# Patient Record
Sex: FEMALE | Race: WHITE
[De-identification: names, ages, dates, MRNs, and addresses within clinical notes are randomized per-mention and may not be internally consistent; named-entity substitution may affect disease eponyms.]

---

## 2018-05-05 ENCOUNTER — HOSPITAL ENCOUNTER (EMERGENCY)
Dept: HOSPITAL 9 - MADERS | Age: 38
Discharge: HOME | End: 2018-05-05
Payer: SELF-PAY

## 2018-05-05 DIAGNOSIS — F41.9: ICD-10-CM

## 2018-05-05 DIAGNOSIS — J01.90: Primary | ICD-10-CM

## 2018-05-05 DIAGNOSIS — F17.210: ICD-10-CM

## 2018-05-05 PROCEDURE — 99283 EMERGENCY DEPT VISIT LOW MDM: CPT

## 2018-07-17 ENCOUNTER — HOSPITAL ENCOUNTER (EMERGENCY)
Dept: HOSPITAL 18 - NAV ERS | Age: 38
Discharge: HOME | End: 2018-07-17
Payer: MEDICAID

## 2018-07-17 DIAGNOSIS — F41.9: ICD-10-CM

## 2018-07-17 DIAGNOSIS — F17.210: ICD-10-CM

## 2018-07-17 DIAGNOSIS — A59.01: Primary | ICD-10-CM

## 2018-07-17 DIAGNOSIS — Z79.899: ICD-10-CM

## 2018-07-17 LAB
BACTERIA UR QL AUTO: (no result) HPF
SP GR UR STRIP: 1 (ref 1–1.04)
URNS CMNT MICRO: NO
WBC UR QL AUTO: (no result) HPF (ref 0–3)

## 2018-07-17 PROCEDURE — 99406 BEHAV CHNG SMOKING 3-10 MIN: CPT

## 2018-07-17 PROCEDURE — 87510 GARDNER VAG DNA DIR PROBE: CPT

## 2018-07-17 PROCEDURE — 87591 N.GONORRHOEAE DNA AMP PROB: CPT

## 2018-07-17 PROCEDURE — 87660 TRICHOMONAS VAGIN DIR PROBE: CPT

## 2018-07-17 PROCEDURE — 87480 CANDIDA DNA DIR PROBE: CPT

## 2018-07-17 PROCEDURE — 81015 MICROSCOPIC EXAM OF URINE: CPT

## 2018-07-17 PROCEDURE — 87086 URINE CULTURE/COLONY COUNT: CPT

## 2018-07-17 PROCEDURE — 87491 CHLMYD TRACH DNA AMP PROBE: CPT

## 2018-07-17 PROCEDURE — 87210 SMEAR WET MOUNT SALINE/INK: CPT

## 2018-07-17 PROCEDURE — 81003 URINALYSIS AUTO W/O SCOPE: CPT

## 2018-09-13 ENCOUNTER — HOSPITAL ENCOUNTER (EMERGENCY)
Dept: HOSPITAL 9 - MADERS | Age: 38
Discharge: HOME | End: 2018-09-13
Payer: MEDICAID

## 2018-09-13 DIAGNOSIS — F17.210: ICD-10-CM

## 2018-09-13 DIAGNOSIS — J01.90: Primary | ICD-10-CM

## 2018-09-13 PROCEDURE — 99283 EMERGENCY DEPT VISIT LOW MDM: CPT

## 2019-05-14 ENCOUNTER — HOSPITAL ENCOUNTER (EMERGENCY)
Dept: HOSPITAL 9 - MADERS | Age: 39
Discharge: HOME | End: 2019-05-14
Payer: MEDICAID

## 2019-05-14 DIAGNOSIS — F17.210: ICD-10-CM

## 2019-05-14 DIAGNOSIS — F41.9: ICD-10-CM

## 2019-05-14 DIAGNOSIS — N83.202: Primary | ICD-10-CM

## 2019-05-14 LAB
ALBUMIN SERPL BCG-MCNC: 4.3 G/DL (ref 3.5–5)
ALP SERPL-CCNC: 110 U/L (ref 40–150)
ALT SERPL W P-5'-P-CCNC: 46 U/L (ref 8–55)
ANION GAP SERPL CALC-SCNC: 12 MMOL/L (ref 10–20)
AST SERPL-CCNC: 21 U/L (ref 5–34)
BACTERIA UR QL AUTO: (no result) HPF
BASOPHILS # BLD AUTO: 0.1 THOU/UL (ref 0–0.2)
BASOPHILS NFR BLD AUTO: 0.9 % (ref 0–1)
BILIRUB SERPL-MCNC: 0.2 MG/DL (ref 0.2–1.2)
BUN SERPL-MCNC: 8 MG/DL (ref 7–18.7)
CALCIUM SERPL-MCNC: 9.5 MG/DL (ref 7.8–10.44)
CHLORIDE SERPL-SCNC: 104 MMOL/L (ref 98–107)
CO2 SERPL-SCNC: 24 MMOL/L (ref 22–29)
CREAT CL PREDICTED SERPL C-G-VRATE: 0 ML/MIN (ref 70–130)
EOSINOPHIL # BLD AUTO: 0.3 THOU/UL (ref 0–0.7)
EOSINOPHIL NFR BLD AUTO: 2.2 % (ref 0–10)
GLOBULIN SER CALC-MCNC: 3.3 G/DL (ref 2.4–3.5)
GLUCOSE SERPL-MCNC: 91 MG/DL (ref 70–105)
HGB BLD-MCNC: 13.5 G/DL (ref 12–16)
LYMPHOCYTES # BLD AUTO: 2.9 THOU/UL (ref 1.2–3.4)
LYMPHOCYTES NFR BLD AUTO: 23.1 % (ref 21–51)
MCH RBC QN AUTO: 27.5 PG (ref 27–31)
MCV RBC AUTO: 86.2 FL (ref 78–98)
MONOCYTES # BLD AUTO: 0.7 THOU/UL (ref 0.11–0.59)
MONOCYTES NFR BLD AUTO: 5.3 % (ref 0–10)
NEUTROPHILS # BLD AUTO: 8.5 THOU/UL (ref 1.4–6.5)
NEUTROPHILS NFR BLD AUTO: 68.5 % (ref 42–75)
PLATELET # BLD AUTO: 293 THOU/UL (ref 130–400)
POTASSIUM SERPL-SCNC: 3.8 MMOL/L (ref 3.5–5.1)
PREGU CONTROL BACKGROUND?: (no result)
PREGU CONTROL BAR APPEAR?: YES
RBC # BLD AUTO: 4.92 MILL/UL (ref 4.2–5.4)
RBC UR QL AUTO: (no result) HPF (ref 0–3)
SODIUM SERPL-SCNC: 136 MMOL/L (ref 136–145)
SP GR UR STRIP: 1.01 (ref 1–1.03)
WBC # BLD AUTO: 12.3 THOU/UL (ref 4.8–10.8)
WBC UR QL AUTO: (no result) HPF (ref 0–3)

## 2019-05-14 PROCEDURE — 85025 COMPLETE CBC W/AUTO DIFF WBC: CPT

## 2019-05-14 PROCEDURE — 81025 URINE PREGNANCY TEST: CPT

## 2019-05-14 PROCEDURE — 81015 MICROSCOPIC EXAM OF URINE: CPT

## 2019-05-14 PROCEDURE — 80053 COMPREHEN METABOLIC PANEL: CPT

## 2019-05-14 PROCEDURE — 81003 URINALYSIS AUTO W/O SCOPE: CPT

## 2019-05-14 PROCEDURE — 74177 CT ABD & PELVIS W/CONTRAST: CPT

## 2019-05-14 NOTE — CT
CT abdomen with contrast

CT pelvis with contrast:



DATE:

5/14/2019



HISTORY:

38-year-old female with bilateral lower quadrant abdominal pain



COMPARISON:

None



TECHNIQUE:

IV injection of iodinated contrast media:Administered

Oral contrast media:Not administered



FINDINGS:

There is minimal dilation of bilateral renal collecting systems, right greater than left. Bilateral r
enal parenchymal nephrograms are symmetrical and normal.

Abdominal aorta, adrenals, pancreas, liver, spleen, appendix, and urinary bladder, are normal.

No signs of colonic diverticulitis.

No small bowel dilation.

2.6 x 2.4 x 2.8 cm left adnexal cyst.

No ascites or pneumoperitoneum.



IMPRESSION:

1) normal appendix.

2) slightly less than 3 cm left adnexal cyst.



Reported By: Miah Woody 

Electronically Signed:  5/14/2019 12:30 PM

## 2019-08-14 ENCOUNTER — HOSPITAL ENCOUNTER (EMERGENCY)
Dept: HOSPITAL 18 - NAV ERS | Age: 39
Discharge: HOME | End: 2019-08-14
Payer: MEDICAID

## 2019-08-14 DIAGNOSIS — J06.9: Primary | ICD-10-CM

## 2019-08-14 DIAGNOSIS — Z79.899: ICD-10-CM

## 2019-08-14 DIAGNOSIS — F17.210: ICD-10-CM

## 2019-08-14 DIAGNOSIS — F41.9: ICD-10-CM

## 2019-08-14 PROCEDURE — 99283 EMERGENCY DEPT VISIT LOW MDM: CPT

## 2020-02-21 ENCOUNTER — HOSPITAL ENCOUNTER (EMERGENCY)
Dept: HOSPITAL 92 - ERS | Age: 40
Discharge: HOME | End: 2020-02-21
Payer: SELF-PAY

## 2020-02-21 DIAGNOSIS — F41.9: ICD-10-CM

## 2020-02-21 DIAGNOSIS — R55: Primary | ICD-10-CM

## 2020-02-21 DIAGNOSIS — F17.210: ICD-10-CM

## 2020-02-21 LAB
ALBUMIN SERPL BCG-MCNC: 4.4 G/DL (ref 3.5–5)
ALP SERPL-CCNC: 73 U/L (ref 40–110)
ALT SERPL W P-5'-P-CCNC: 22 U/L (ref 8–55)
ANION GAP SERPL CALC-SCNC: 11 MMOL/L (ref 10–20)
APAP SERPL-MCNC: (no result) MCG/ML (ref 10–30)
AST SERPL-CCNC: 13 U/L (ref 5–34)
BASOPHILS # BLD AUTO: 0.1 THOU/UL (ref 0–0.2)
BASOPHILS NFR BLD AUTO: 0.9 % (ref 0–1)
BILIRUB SERPL-MCNC: 0.3 MG/DL (ref 0.2–1.2)
BUN SERPL-MCNC: 9 MG/DL (ref 7–18.7)
CALCIUM SERPL-MCNC: 9.6 MG/DL (ref 7.8–10.44)
CHLORIDE SERPL-SCNC: 106 MMOL/L (ref 98–107)
CK SERPL-CCNC: 47 U/L (ref 29–168)
CO2 SERPL-SCNC: 26 MMOL/L (ref 22–29)
CREAT CL PREDICTED SERPL C-G-VRATE: 0 ML/MIN (ref 70–130)
EOSINOPHIL # BLD AUTO: 0.3 THOU/UL (ref 0–0.7)
EOSINOPHIL NFR BLD AUTO: 2.4 % (ref 0–10)
GLOBULIN SER CALC-MCNC: 3.1 G/DL (ref 2.4–3.5)
GLUCOSE SERPL-MCNC: 84 MG/DL (ref 70–105)
HGB BLD-MCNC: 14.9 G/DL (ref 12–16)
LYMPHOCYTES # BLD: 2.8 THOU/UL (ref 1.2–3.4)
LYMPHOCYTES NFR BLD AUTO: 23.7 % (ref 21–51)
MCH RBC QN AUTO: 30.5 PG (ref 27–31)
MCV RBC AUTO: 89.8 FL (ref 78–98)
MONOCYTES # BLD AUTO: 0.9 THOU/UL (ref 0.11–0.59)
MONOCYTES NFR BLD AUTO: 7.4 % (ref 0–10)
NEUTROPHILS # BLD AUTO: 7.7 THOU/UL (ref 1.4–6.5)
NEUTROPHILS NFR BLD AUTO: 65.6 % (ref 42–75)
PLATELET # BLD AUTO: 277 THOU/UL (ref 130–400)
POTASSIUM SERPL-SCNC: 3.8 MMOL/L (ref 3.5–5.1)
PREGS CONTROL BACKGROUND?: (no result)
PREGS CONTROL BAR APPEAR?: YES
RBC # BLD AUTO: 4.89 MILL/UL (ref 4.2–5.4)
SALICYLATES SERPL-MCNC: (no result) MG/DL (ref 15–30)
SODIUM SERPL-SCNC: 139 MMOL/L (ref 136–145)
TSH SERPL DL<=0.005 MIU/L-ACNC: 0.6 UIU/ML (ref 0.35–4.94)
WBC # BLD AUTO: 11.7 THOU/UL (ref 4.8–10.8)

## 2020-02-21 PROCEDURE — 80053 COMPREHEN METABOLIC PANEL: CPT

## 2020-02-21 PROCEDURE — 84703 CHORIONIC GONADOTROPIN ASSAY: CPT

## 2020-02-21 PROCEDURE — 80307 DRUG TEST PRSMV CHEM ANLYZR: CPT

## 2020-02-21 PROCEDURE — 82550 ASSAY OF CK (CPK): CPT

## 2020-02-21 PROCEDURE — 94760 N-INVAS EAR/PLS OXIMETRY 1: CPT

## 2020-02-21 PROCEDURE — 36415 COLL VENOUS BLD VENIPUNCTURE: CPT

## 2020-02-21 PROCEDURE — 85025 COMPLETE CBC W/AUTO DIFF WBC: CPT

## 2020-02-21 PROCEDURE — 84443 ASSAY THYROID STIM HORMONE: CPT

## 2020-02-21 PROCEDURE — 71045 X-RAY EXAM CHEST 1 VIEW: CPT

## 2020-02-21 PROCEDURE — 93005 ELECTROCARDIOGRAM TRACING: CPT

## 2020-02-21 PROCEDURE — 84484 ASSAY OF TROPONIN QUANT: CPT

## 2020-02-21 NOTE — RAD
Exam: Chest one view



HISTORY:Chest



Comparison: 10/29/2004



FINDINGS:

Cardiac silhouette: Normal

Aorta: Unremarkable

Pulmonary vessels: Normal

Costophrenic angles: Clear



LUNGS: No masses or consolidation.



Pneumothorax: None



Osseous abnormalities: None



IMPRESSION: No acute cardiopulmonary process.



Reported By: Ricardo Nolasco 

Electronically Signed:  2/21/2020 3:06 PM